# Patient Record
Sex: FEMALE | Race: WHITE
[De-identification: names, ages, dates, MRNs, and addresses within clinical notes are randomized per-mention and may not be internally consistent; named-entity substitution may affect disease eponyms.]

---

## 2019-07-09 ENCOUNTER — HOSPITAL ENCOUNTER (OUTPATIENT)
Dept: HOSPITAL 74 - FASU | Age: 69
Discharge: HOME | End: 2019-07-09
Attending: OPHTHALMOLOGY
Payer: COMMERCIAL

## 2019-07-09 VITALS — SYSTOLIC BLOOD PRESSURE: 118 MMHG | HEART RATE: 78 BPM | DIASTOLIC BLOOD PRESSURE: 67 MMHG

## 2019-07-09 VITALS — TEMPERATURE: 97.9 F

## 2019-07-09 VITALS — BODY MASS INDEX: 23.7 KG/M2

## 2019-07-09 DIAGNOSIS — H25.22: Primary | ICD-10-CM

## 2019-07-09 PROCEDURE — 08RK3JZ REPLACEMENT OF LEFT LENS WITH SYNTHETIC SUBSTITUTE, PERCUTANEOUS APPROACH: ICD-10-PCS | Performed by: OPHTHALMOLOGY

## 2019-07-09 RX ADMIN — PHENYLEPHRINE HYDROCHLORIDE SCH DROP: 0.25 SPRAY NASAL at 09:40

## 2019-07-09 RX ADMIN — CYCLOPENTOLATE HYDROCHLORIDE SCH DROP: 10 SOLUTION/ DROPS OPHTHALMIC at 09:30

## 2019-07-09 RX ADMIN — TROPICAMIDE SCH DROP: 10 SOLUTION/ DROPS OPHTHALMIC at 09:40

## 2019-07-09 RX ADMIN — OFLOXACIN SCH DROP: 3 SOLUTION/ DROPS OPHTHALMIC at 09:25

## 2019-07-09 RX ADMIN — TROPICAMIDE SCH DROP: 10 SOLUTION/ DROPS OPHTHALMIC at 09:25

## 2019-07-09 RX ADMIN — CYCLOPENTOLATE HYDROCHLORIDE SCH DROP: 10 SOLUTION/ DROPS OPHTHALMIC at 09:35

## 2019-07-09 RX ADMIN — TROPICAMIDE SCH DROP: 10 SOLUTION/ DROPS OPHTHALMIC at 09:35

## 2019-07-09 RX ADMIN — PHENYLEPHRINE HYDROCHLORIDE SCH DROP: 0.25 SPRAY NASAL at 09:30

## 2019-07-09 RX ADMIN — TROPICAMIDE SCH DROP: 10 SOLUTION/ DROPS OPHTHALMIC at 09:30

## 2019-07-09 RX ADMIN — KETOROLAC TROMETHAMINE SCH DROP: 5 SOLUTION OPHTHALMIC at 09:25

## 2019-07-09 RX ADMIN — KETOROLAC TROMETHAMINE SCH DROP: 5 SOLUTION OPHTHALMIC at 09:30

## 2019-07-09 RX ADMIN — PHENYLEPHRINE HYDROCHLORIDE SCH DROP: 0.25 SPRAY NASAL at 09:35

## 2019-07-09 RX ADMIN — PHENYLEPHRINE HYDROCHLORIDE SCH DROP: 0.25 SPRAY NASAL at 09:25

## 2019-07-09 RX ADMIN — PHENYLEPHRINE HYDROCHLORIDE SCH DROP: 0.25 SPRAY NASAL at 09:20

## 2019-07-09 RX ADMIN — TROPICAMIDE SCH DROP: 10 SOLUTION/ DROPS OPHTHALMIC at 09:20

## 2019-07-09 RX ADMIN — KETOROLAC TROMETHAMINE SCH DROP: 5 SOLUTION OPHTHALMIC at 09:40

## 2019-07-09 RX ADMIN — OFLOXACIN SCH DROP: 3 SOLUTION/ DROPS OPHTHALMIC at 09:35

## 2019-07-09 RX ADMIN — OFLOXACIN SCH DROP: 3 SOLUTION/ DROPS OPHTHALMIC at 09:40

## 2019-07-09 RX ADMIN — OFLOXACIN SCH DROP: 3 SOLUTION/ DROPS OPHTHALMIC at 09:30

## 2019-07-09 RX ADMIN — CYCLOPENTOLATE HYDROCHLORIDE SCH DROP: 10 SOLUTION/ DROPS OPHTHALMIC at 09:20

## 2019-07-09 RX ADMIN — CYCLOPENTOLATE HYDROCHLORIDE SCH DROP: 10 SOLUTION/ DROPS OPHTHALMIC at 09:40

## 2019-07-09 RX ADMIN — OFLOXACIN SCH DROP: 3 SOLUTION/ DROPS OPHTHALMIC at 09:20

## 2019-07-09 RX ADMIN — CYCLOPENTOLATE HYDROCHLORIDE SCH DROP: 10 SOLUTION/ DROPS OPHTHALMIC at 09:25

## 2019-07-09 RX ADMIN — KETOROLAC TROMETHAMINE SCH DROP: 5 SOLUTION OPHTHALMIC at 09:35

## 2019-07-09 RX ADMIN — KETOROLAC TROMETHAMINE SCH DROP: 5 SOLUTION OPHTHALMIC at 09:20

## 2019-07-09 NOTE — OP
DATE OF OPERATION:  07/09/2019

 

AGE:  69 years old.

 

SEX:  Female.

 

PREOPERATIVE DIAGNOSIS:  Cataract, left eye.

 

POSTOPERATIVE DIAGNOSIS:  Cataract, left eye.

 

PROCEDURE:  Cataract extraction via phacoemulsification with insertion of posterior

chamber lens implant, left eye.

 

SURGEON:  Brian Wesley MD

 

ASSISTANT:  Isabel Lugo MD

 

ANESTHESIA:  Topical with sedation.

 

ESTIMATED BLOOD LOSS:  Less than 1 mL.  

 

COMPLICATIONS:  None.

 

SPECIMENS:  None.

 

PROCEDURE:  The patient was identified in the holding area.  After all risks,

benefits and alternatives were explained to the patient, informed consent was

obtained.  The left eye was marked with a marking pen.  The patient then entered the

operating room on an eye stretcher.  After formal timeout was performed, topical

tetracaine eyedrops were instilled onto the left eye.  The left eye was then prepped

and draped in the usual sterile fashion.  An eyelid speculum was placed beneath the

eyelids of the left eye.  An infratemporal paracentesis incision was created using a

15-degree blade. Topical preservative-free epinephrine and preservative-free

lidocaine was then injected into the anterior chamber.  Viscoelastic was then

injected into the anterior chamber.  A 2.4-mm keratome blade was then used to make a

supratemporal incision, and then a 3.0-mm keratome blade was then used to expand the

incision.  A 360-degree continuous curvilinear capsulorrhexis was then created using

bent cystotome and Utrata forceps.  Hydrodissection was performed using balanced

saline solution on a cannula.  Phacoemulsification was introduced to disassemble and

remove the nucleus in its entirety.  Irrigation/aspiration was then used to remove

any remaining cortical material from the eye.  The capsular bag was re-formed using

viscoelastic.  An Vlad model MA60MA with a power of 4.0 diopters, serial number

[id number], was inspected and found to be defect free and injected in the capsular

bag.  Irrigation/aspiration was then used to remove any remaining viscoelastic from

the eye.  The anterior chamber was reformed using balanced saline solution. 

Intracameral injection of Miochol was then administered and the pupil came down and

was round.  All wounds were hydrated with balanced saline solution and noted to be

watertight.  There was a red reflex present.  The anterior chamber was deep.  The

lens was perfectly centered in the capsular bag and the eye had adequate pressure. 

Topical antibiotic eyedrops and ointment were then administered to the left eye.  The

eyelid speculum was removed from the left eye.  The left eye was shielded.  The

patient tolerated the procedure well and left the operating room in stable condition,

to follow up in the eye clinic tomorrow morning at 10.

 

 

 

BRIAN WESLEY M.D.

 

[id number]

DD: 07/09/2019 11:35

DT: 07/09/2019 14:38

Job #:  [id number]

## 2019-07-23 ENCOUNTER — HOSPITAL ENCOUNTER (OUTPATIENT)
Dept: HOSPITAL 74 - FASU | Age: 69
Discharge: HOME | End: 2019-07-23
Attending: OPHTHALMOLOGY
Payer: COMMERCIAL

## 2019-07-23 VITALS — DIASTOLIC BLOOD PRESSURE: 72 MMHG | SYSTOLIC BLOOD PRESSURE: 142 MMHG | TEMPERATURE: 98.2 F

## 2019-07-23 VITALS — HEART RATE: 66 BPM

## 2019-07-23 VITALS — BODY MASS INDEX: 23.3 KG/M2

## 2019-07-23 DIAGNOSIS — H26.9: Primary | ICD-10-CM

## 2019-07-23 PROCEDURE — 08RJ3JZ REPLACEMENT OF RIGHT LENS WITH SYNTHETIC SUBSTITUTE, PERCUTANEOUS APPROACH: ICD-10-PCS | Performed by: OPHTHALMOLOGY

## 2019-07-23 RX ADMIN — PHENYLEPHRINE HYDROCHLORIDE SCH DROP: 0.25 SPRAY NASAL at 10:30

## 2019-07-23 RX ADMIN — OFLOXACIN SCH DROP: 3 SOLUTION/ DROPS OPHTHALMIC at 10:40

## 2019-07-23 RX ADMIN — TROPICAMIDE SCH DROP: 10 SOLUTION/ DROPS OPHTHALMIC at 10:25

## 2019-07-23 RX ADMIN — KETOROLAC TROMETHAMINE SCH DROP: 5 SOLUTION OPHTHALMIC at 10:25

## 2019-07-23 RX ADMIN — KETOROLAC TROMETHAMINE SCH DROP: 5 SOLUTION OPHTHALMIC at 10:35

## 2019-07-23 RX ADMIN — CYCLOPENTOLATE HYDROCHLORIDE SCH DROP: 10 SOLUTION/ DROPS OPHTHALMIC at 10:40

## 2019-07-23 RX ADMIN — OFLOXACIN SCH DROP: 3 SOLUTION/ DROPS OPHTHALMIC at 10:25

## 2019-07-23 RX ADMIN — KETOROLAC TROMETHAMINE SCH DROP: 5 SOLUTION OPHTHALMIC at 10:40

## 2019-07-23 RX ADMIN — PHENYLEPHRINE HYDROCHLORIDE SCH DROP: 0.25 SPRAY NASAL at 10:35

## 2019-07-23 RX ADMIN — TROPICAMIDE SCH DROP: 10 SOLUTION/ DROPS OPHTHALMIC at 10:35

## 2019-07-23 RX ADMIN — CYCLOPENTOLATE HYDROCHLORIDE SCH DROP: 10 SOLUTION/ DROPS OPHTHALMIC at 10:35

## 2019-07-23 RX ADMIN — PHENYLEPHRINE HYDROCHLORIDE SCH DROP: 0.25 SPRAY NASAL at 10:45

## 2019-07-23 RX ADMIN — TROPICAMIDE SCH DROP: 10 SOLUTION/ DROPS OPHTHALMIC at 10:30

## 2019-07-23 RX ADMIN — KETOROLAC TROMETHAMINE SCH DROP: 5 SOLUTION OPHTHALMIC at 10:45

## 2019-07-23 RX ADMIN — OFLOXACIN SCH DROP: 3 SOLUTION/ DROPS OPHTHALMIC at 10:35

## 2019-07-23 RX ADMIN — KETOROLAC TROMETHAMINE SCH DROP: 5 SOLUTION OPHTHALMIC at 10:30

## 2019-07-23 RX ADMIN — CYCLOPENTOLATE HYDROCHLORIDE SCH DROP: 10 SOLUTION/ DROPS OPHTHALMIC at 10:25

## 2019-07-23 RX ADMIN — CYCLOPENTOLATE HYDROCHLORIDE SCH DROP: 10 SOLUTION/ DROPS OPHTHALMIC at 10:30

## 2019-07-23 RX ADMIN — PHENYLEPHRINE HYDROCHLORIDE SCH DROP: 0.25 SPRAY NASAL at 10:25

## 2019-07-23 RX ADMIN — TROPICAMIDE SCH DROP: 10 SOLUTION/ DROPS OPHTHALMIC at 10:40

## 2019-07-23 RX ADMIN — PHENYLEPHRINE HYDROCHLORIDE SCH DROP: 0.25 SPRAY NASAL at 10:40

## 2019-07-23 RX ADMIN — OFLOXACIN SCH DROP: 3 SOLUTION/ DROPS OPHTHALMIC at 10:30

## 2019-07-23 RX ADMIN — CYCLOPENTOLATE HYDROCHLORIDE SCH DROP: 10 SOLUTION/ DROPS OPHTHALMIC at 10:45

## 2019-07-23 RX ADMIN — TROPICAMIDE SCH DROP: 10 SOLUTION/ DROPS OPHTHALMIC at 10:45

## 2019-07-23 RX ADMIN — OFLOXACIN SCH DROP: 3 SOLUTION/ DROPS OPHTHALMIC at 10:45

## 2019-07-23 NOTE — OP
DATE OF OPERATION:  07/23/2019

 

AGE:  69 years old.

 

SEX:  Female.

 

PREOPERATIVE DIAGNOSIS:  Cataract, right eye.

 

POSTOPERATIVE DIAGNOSIS:  Cataract, right eye.

 

PROCEDURE:  Cataract extraction via phacoemulsification with insertion of posterior

chamber lens implant, right eye.

 

SURGEON:  Brian Cancino MD

 

ASSISTANT:  Isabel Lugo MD

 

ANESTHESIA:  Topical with sedation.

 

ESTIMATED BLOOD LOSS:  Less than 1 mL.

 

COMPLICATIONS:  None.

 

SPECIMENS:  None.

 

PROCEDURE:  The patient was identified in the holding area.  After all risks,

benefits and alternatives were explained to the patient, informed consent was

obtained.  The right eye was marked with a marking pen.  The patient then entered the

operating room on an eye stretcher.  After a formal timeout was performed, topical

tetracaine eyedrops were instilled onto the right eye.  The right eye was then

prepped and draped in the usual sterile fashion.   An eyelid speculum was placed

beneath the eyelids of the right eye.  A supratemporal paracentesis incision was

created using a 15-degree blade.  Topical preservative-free epinephrine and

preservative-free lidocaine was then injected into the anterior chamber. 

Viscoelastic was then injected into the anterior chamber.  A 2.4-mm keratome blade

was then used to make an infratemporal incision.  A 360-degree continuous curvilinear

capsulorrhexis was then created using bent cystotome and Utrata forceps. 

Hydrodissection was performed using balanced saline solution on a cannula. 

Phacoemulsification was introduced to disassemble and remove the nucleus in its

entirety.  Irrigation/aspiration was then used to remove any remaining cortical

material from the eye.  The capsular bag was re-formed using viscoelastic.  An Vlad

model MA60MA with a power of 3.0 diopters, serial number [id number] was inspected

and found to be defect free and injected into the capsular bag after enlarging the

wound to about 3 mm.  The lens was rotated so that it was sitting perfectly centered

in the capsular bag.  Irrigation/aspiration was then used to remove any remaining

viscoelastic from the eye.  The anterior chamber was reformed using balanced saline

solution.  Intracameral injection of Miochol was then administered and the pupil came

down and was round.  All wounds were hydrated with balanced saline solution and it

was noted that the infratemporal wound was leaking, so interrupted 10-0 double nylon

sutures were placed to close the wound with the knots buried.  After it was found

that all wounds were watertight and dry, it was noted that there was a red reflex

present, the anterior chamber was deep, then lens was perfectly centered in the

capsular bag, and the eye had adequate pressure.  Topical antibiotic eyedrops and

ointment were then administered to the right eye.  The eyelid speculum was removed

from the right eye.  The right eye was shielded.  The patient tolerated the procedure

well and left the operating room in stable condition, to follow up in the eye clinic

the following morning at 10.

 

 

 

MARQUEZ [id number]

DD: 07/23/2019 13:16

DT: 07/23/2019 21:36

Job #:  [id number]